# Patient Record
Sex: MALE | Race: BLACK OR AFRICAN AMERICAN | URBAN - METROPOLITAN AREA
[De-identification: names, ages, dates, MRNs, and addresses within clinical notes are randomized per-mention and may not be internally consistent; named-entity substitution may affect disease eponyms.]

---

## 2018-03-13 ENCOUNTER — OFFICE VISIT (OUTPATIENT)
Dept: ORTHOPEDICS | Facility: CLINIC | Age: 48
End: 2018-03-13
Payer: COMMERCIAL

## 2018-03-13 ENCOUNTER — RADIANT APPOINTMENT (OUTPATIENT)
Dept: GENERAL RADIOLOGY | Facility: CLINIC | Age: 48
End: 2018-03-13
Attending: FAMILY MEDICINE
Payer: COMMERCIAL

## 2018-03-13 VITALS — RESPIRATION RATE: 16 BRPM | HEART RATE: 70 BPM | DIASTOLIC BLOOD PRESSURE: 85 MMHG | SYSTOLIC BLOOD PRESSURE: 129 MMHG

## 2018-03-13 DIAGNOSIS — M54.2 CERVICALGIA: Primary | ICD-10-CM

## 2018-03-13 DIAGNOSIS — M79.2 RADICULAR PAIN IN RIGHT ARM: ICD-10-CM

## 2018-03-13 DIAGNOSIS — M54.2 CERVICALGIA: ICD-10-CM

## 2018-03-13 NOTE — PROGRESS NOTES
Sports Medicine Clinic Visit    PCP: No primary care provider on file.    Tony Welsh is a 48 year old male who is seen as self referral presenting with left upper extremity pain and tingling. He also reports pain into left foot. He points to posterior left elbow as painful, tingling into left 2nd and 3rd fingers.     Injury: None    Location of Pain: left upper extremity  Duration of Pain: 3 week(s)  Rating of Pain: 6/10  Pain is better with: Nothing  Pain is worse with: Holding objects, standing, bicep curls  Additional Features: Tingling  Treatment so far consists of: Nothing  Prior History of related problems: Surgery in [place] for Kidney Stones in 1985    There were no vitals taken for this visit.         PMH:  Nephrolithiasis    Active problem list:  There is no problem list on file for this patient.      FH:  No family history on file.    SH:  Social History     Social History     Marital status: Single     Spouse name: N/A     Number of children: N/A     Years of education: N/A     Occupational History     Not on file.     Social History Main Topics     Smoking status: Never Smoker     Smokeless tobacco: Never Used     Alcohol use Yes      Comment: 2-3 drinks per week     Drug use: No     Sexual activity: Not on file     Other Topics Concern     Not on file     Social History Narrative       MEDS:  See EMR, reviewed  ALL:  See EMR, reviewed    REVIEW OF SYSTEMS:  CONSTITUTIONAL:NEGATIVE for fever, chills, change in weight  INTEGUMENTARY/SKIN: NEGATIVE for worrisome rashes, moles or lesions  EYES: NEGATIVE for vision changes or irritation  ENT/MOUTH: NEGATIVE for ear, mouth and throat problems  RESP:NEGATIVE for significant cough or SOB  BREAST: NEGATIVE for masses, tenderness or discharge  CV: NEGATIVE for chest pain, palpitations or peripheral edema  GI: NEGATIVE for nausea, abdominal pain, heartburn, or change in bowel habits  :NEGATIVE for frequency, dysuria, or hematuria  :NEGATIVE for frequency,  dysuria, or hematuria  NEURO: NEGATIVE for weakness, dizziness or paresthesias  ENDOCRINE: NEGATIVE for temperature intolerance, skin/hair changes  HEME/ALLERGY/IMMUNE: NEGATIVE for bleeding problems  PSYCHIATRIC: NEGATIVE for changes in mood or affect        SUBJECTIVE:  This 48-year-old male in the last 3 weeks has had discomfort on the left side of his neck that he feels into his left deltoid area of his shoulder.  He feels it radiating down to his left elbow with a tingling sensation in the second and third fingers of his left hand.  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  Approximately a year ago he had similar trouble that he could feel in his neck and left arm that lasted about a week.  It also involved radiating down the left arm but then it seemed to improve.  He denies any past history of neck injuries.  He denies discomfort or numbness and tingling in the early hours of morning.      OBJECTIVE:  He has no impingement signs at either shoulder.  Strength is intact bilaterally at the deltoid, supraspinatus, infraspinatus, subscapularis, biceps, triceps, forearm flexion, extension, grasp and digiti minimi strength.  Strength is symmetrical bilaterally.  A Phalen's test is negative, and a Tinel sign is negative.  Spurling's test is positive reproducing discomfort into his left upper shoulder.   Distal pulses normal.  Skin normal.  A/o x 3.        X-ray of the cervical spine shows multilevel degenerative disc and joint changes from C4-C5 C5-C6 and mildly C6-C7.    Assessment: Cervicalgia with left radicular arm pain    Plan: With the help of a model we went over the findings of his neck x-ray.  He will look for improvement his symptoms over the next 4 weeks.  Over-the-counter pain medicines and their side effects are explained.  He will see physical therapy for an attempted cervical traction and home traction  if it is helpful as well as a neck maintenance program.  He will follow-up with me in the next 4-6 weeks if not improved.  He knows the next step could be an MRI of the cervical spine if necessary an epidural injection.

## 2018-03-13 NOTE — MR AVS SNAPSHOT
After Visit Summary   3/13/2018    Tony Welsh    MRN: [id number]           Patient Information     Date Of Birth          1970        Visit Information        Provider Department      3/13/2018 9:15 AM Emmanuel Gallegos MD [facility] Sports Medicine        Today's Diagnoses     Cervicalgia    -  1    Radicular pain in right arm           Follow-ups after your visit        Additional Services     [facility] PT, HAND, AND CHIROPRACTIC REFERRAL       === This order will print in the [facility] Scheduling  Office ===    Physical therapy, hand therapy and chiropractic care are available through:    [facility]  [facility]    Call one easy number to schedule at any of the above locations:  [phone].    Your provider has referred you to Physical Therapy at [facility] or [facility], [facility] physical therapy    Indication/Reason for Referral: Neck Pain  Onset of Illness:  And left radicular arm pain x 3 wks.    Therapy Orders:  Evaluate and Treat  Special Programs:  None  Special Request:  None    Additional Comments for the therapist or chiropractor:  Cervical traction, home traction if helpful.   Neck maintenance pgm.  6-8 vits    Please be aware that coverage of these services is subject to the terms and limitations of your health insurance plan.  Call member services at your health plan with any benefit or coverage questions.      Please bring the following to your appointment:    >>   Your personal calendar for scheduling future appointments  >>   Comfortable clothing                  Your next 10 appointments already scheduled     May 11, 2018  9:00 AM CDT   (Arrive by 8:45 AM)   Return Visit with Yumiko Valero MD   [facility] Urology and [facility] ([facility])    [address] [place] [address] [phone]              Who to contact     Please call  your clinic at [phone] to:    Ask questions about your health    Make or cancel appointments    Discuss your medicines    Learn about your test results    Speak to your doctor            Additional Information About Your Visit        MyChart Information     [employer] is an electronic gateway that provides easy, online access to your medical records. With [employer], you can request a clinic appointment, read your test results, renew a prescription or communicate with your care team.     To sign up for [employer] visit the website at [email or web address]   You will be asked to enter the access code listed below, as well as some personal information. Please follow the directions to create your username and password.     Your access code is: [email or web address]  Expires: 2018  7:30 AM     Your access code will  in 90 days. If you need help or a new code, please contact your [facility] or call [phone] for assistance.        Care EveryWhere ID     This is your Care EveryWhere ID. This could be used by other organizations to access your [place] medical records  [id number]        Your Vitals Were     Pulse Respirations                70 16           Blood Pressure from Last 3 Encounters:   18 129/85   16 117/89   16 117/89    Weight from Last 3 Encounters:   16 84.8 kg (187 lb)   16 84.8 kg (187 lb)   16 83 kg (183 lb)              We Performed the Following     [facility] PT, HAND, AND CHIROPRACTIC REFERRAL        Primary Care Provider    None Specified       No primary provider on file.        Equal Access to Services     [facility]: Hadii aad jose juan hadasho Sosimonaali, waaxda luqadaha, qaybta kaalmada adeegyada, kareem waldrop . So [facility] [phone].    ATENCIÓN: Si habla español, tiene a montano disposición servicios gratuitos de asistencia lingüística. Llame al [phone].    We comply with applicable federal civil rights  laws and [place] laws. We do not discriminate on the basis of race, color, national origin, age, disability, sex, sexual orientation, or gender identity.            Thank you!     Thank you for choosing [facility]  for your care. Our goal is always to provide you with excellent care. Hearing back from our patients is one way we can continue to improve our services. Please take a few minutes to complete the written survey that you may receive in the mail after your visit with us. Thank you!             Your Updated Medication List - Protect others around you: Learn how to safely use, store and throw away your medicines at www.disposemymeds.org.          This list is accurate as of 3/13/18 11:59 PM.  Always use your most recent med list.                   Brand Name Dispense Instructions for use Diagnosis    * alfuzosin 10 MG 24 hr tablet    UROXATRAL    90 tablet    Take 1 tablet (10 mg) by mouth daily    Slow urinary stream       * alfuzosin 10 MG 24 hr tablet    UROXATRAL    30 tablet    Take 1 tablet (10 mg) by mouth daily    Urinary hesitancy       * alfuzosin 10 MG 24 hr tablet    UROXATRAL    90 tablet    Take 1 tablet (10 mg) by mouth daily    Urinary frequency       clomiPRAMINE 25 MG capsule    ANAFRANIL    30 capsule    Take 1-2 capsules (25-50 mg) by mouth daily as needed Take at least 4 hours before intercourse to decrease premature ejaculation.    Premature ejaculation       lidocaine 2 % topical gel    XYLOCAINE    30 mL    Apply topically as needed for other (apply 5-10 min before intercourse)    Premature ejaculation       NYQUIL PO           * Notice:  This list has 3 medication(s) that are the same as other medications prescribed for you. Read the directions carefully, and ask your doctor or other care provider to review them with you.

## 2018-03-13 NOTE — LETTER
3/13/2018      RE: Tony Welsh  [address]  [place] [facility] [address]       Sports Medicine Clinic Visit    PCP: No primary care provider on file.    Tony Welsh is a 48 year old male who is seen as self referral presenting with left upper extremity pain and tingling. He also reports pain into left foot. He points to posterior left elbow as painful, tingling into left 2nd and 3rd fingers.     Injury: None    Location of Pain: left upper extremity  Duration of Pain: 3 week(s)  Rating of Pain: 6/10  Pain is better with: Nothing  Pain is worse with: Holding objects, standing, bicep curls  Additional Features: Tingling  Treatment so far consists of: Nothing  Prior History of related problems: Surgery in [place] for Kidney Stones in 1985    There were no vitals taken for this visit.         PMH:  Nephrolithiasis    Active problem list:  There is no problem list on file for this patient.      FH:  No family history on file.    SH:  Social History     Social History     Marital status: Single     Spouse name: N/A     Number of children: N/A     Years of education: N/A     Occupational History     Not on file.     Social History Main Topics     Smoking status: Never Smoker     Smokeless tobacco: Never Used     Alcohol use Yes      Comment: 2-3 drinks per week     Drug use: No     Sexual activity: Not on file     Other Topics Concern     Not on file     Social History Narrative       MEDS:  See EMR, reviewed  ALL:  See EMR, reviewed    REVIEW OF SYSTEMS:  CONSTITUTIONAL:NEGATIVE for fever, chills, change in weight  INTEGUMENTARY/SKIN: NEGATIVE for worrisome rashes, moles or lesions  EYES: NEGATIVE for vision changes or irritation  ENT/MOUTH: NEGATIVE for ear, mouth and throat problems  RESP:NEGATIVE for significant cough or SOB  BREAST: NEGATIVE for masses, tenderness or discharge  CV: NEGATIVE for chest pain, palpitations or peripheral edema  GI: NEGATIVE for nausea, abdominal pain, heartburn, or change in bowel  habits  :NEGATIVE for frequency, dysuria, or hematuria  :NEGATIVE for frequency, dysuria, or hematuria  NEURO: NEGATIVE for weakness, dizziness or paresthesias  ENDOCRINE: NEGATIVE for temperature intolerance, skin/hair changes  HEME/ALLERGY/IMMUNE: NEGATIVE for bleeding problems  PSYCHIATRIC: NEGATIVE for changes in mood or affect        SUBJECTIVE:  This 48-year-old male in the last 3 weeks has had discomfort on the left side of his neck that he feels into his left deltoid area of his shoulder.  He feels it radiating down to his left elbow with a tingling sensation in the second and third fingers of his left hand.  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  Approximately a year ago he had similar trouble that he could feel in his neck and left arm that lasted about a week.  It also involved radiating down the left arm but then it seemed to improve.  He denies any past history of neck injuries.  He denies discomfort or numbness and tingling in the early hours of morning.      OBJECTIVE:  He has no impingement signs at either shoulder.  Strength is intact bilaterally at the deltoid, supraspinatus, infraspinatus, subscapularis, biceps, triceps, forearm flexion, extension, grasp and digiti minimi strength.  Strength is symmetrical bilaterally.  A Phalen's test is negative, and a Tinel sign is negative.  Spurling's test is positive reproducing discomfort into his left upper shoulder.   Distal pulses normal.  Skin normal.  A/o x 3.        X-ray of the cervical spine shows multilevel degenerative disc and joint changes from C4-C5 C5-C6 and mildly C6-C7.    Assessment: Cervicalgia with left radicular arm pain    Plan: With the help of a model we went over the findings of his neck x-ray.  He will look for improvement his symptoms over the next 4 weeks.  Over-the-counter pain medicines and their side effects are  explained.  He will see physical therapy for an attempted cervical traction and home traction if it is helpful as well as a neck maintenance program.  He will follow-up with me in the next 4-6 weeks if not improved.  He knows the next step could be an MRI of the cervical spine if necessary an epidural injection.    Emmanuel Gallegos MD

## 2018-06-01 DIAGNOSIS — R39.15 URINARY URGENCY: Primary | ICD-10-CM

## 2018-06-05 DIAGNOSIS — R35.0 URINARY FREQUENCY: Primary | ICD-10-CM

## 2018-06-05 LAB — RESIDUAL VOLUME (RV) (EXTERNAL): 80

## 2018-06-07 ENCOUNTER — OFFICE VISIT (OUTPATIENT)
Dept: UROLOGY | Facility: CLINIC | Age: 48
End: 2018-06-07
Payer: COMMERCIAL

## 2018-06-07 VITALS
BODY MASS INDEX: 27.31 KG/M2 | SYSTOLIC BLOOD PRESSURE: 102 MMHG | WEIGHT: 160 LBS | HEIGHT: 64 IN | HEART RATE: 65 BPM | OXYGEN SATURATION: 95 % | DIASTOLIC BLOOD PRESSURE: 68 MMHG

## 2018-06-07 DIAGNOSIS — R39.15 URINARY URGENCY: ICD-10-CM

## 2018-06-07 LAB
ALBUMIN UR-MCNC: NEGATIVE MG/DL
APPEARANCE UR: CLEAR
BILIRUB UR QL STRIP: NEGATIVE
COLOR UR AUTO: YELLOW
GLUCOSE UR STRIP-MCNC: NEGATIVE MG/DL
HGB UR QL STRIP: NEGATIVE
KETONES UR STRIP-MCNC: NEGATIVE MG/DL
LEUKOCYTE ESTERASE UR QL STRIP: NEGATIVE
NITRATE UR QL: NEGATIVE
PH UR STRIP: 6.5 PH (ref 5–7)
PSA SERPL-MCNC: 2.4 NG/ML (ref 0–4)
RESIDUAL VOLUME (RV) (EXTERNAL): 20
SOURCE: NORMAL
SP GR UR STRIP: 1.02 (ref 1–1.03)
UROBILINOGEN UR STRIP-ACNC: 0.2 EU/DL (ref 0.2–1)

## 2018-06-07 PROCEDURE — 84153 ASSAY OF PSA TOTAL: CPT | Performed by: UROLOGY

## 2018-06-07 PROCEDURE — 81003 URINALYSIS AUTO W/O SCOPE: CPT | Performed by: UROLOGY

## 2018-06-07 PROCEDURE — 36415 COLL VENOUS BLD VENIPUNCTURE: CPT | Performed by: UROLOGY

## 2018-06-07 PROCEDURE — 99213 OFFICE O/P EST LOW 20 MIN: CPT | Performed by: UROLOGY

## 2018-06-07 PROCEDURE — 51798 US URINE CAPACITY MEASURE: CPT

## 2018-06-07 RX ORDER — ALFUZOSIN HYDROCHLORIDE 10 MG/1
10 TABLET, EXTENDED RELEASE ORAL DAILY
Qty: 30 TABLET | Refills: 3 | Status: SHIPPED | OUTPATIENT
Start: 2018-06-07

## 2018-06-07 ASSESSMENT — PAIN SCALES - GENERAL: PAINLEVEL: NO PAIN (0)

## 2018-06-07 NOTE — NURSING NOTE
Chief Complaint   Patient presents with     Clinic Care Coordination - Follow-up     Pt here for follow-up     Estelle Chapin CMA

## 2018-06-07 NOTE — MR AVS SNAPSHOT
"              After Visit Summary   6/7/2018    Tony Welsh    MRN: [id number]           Patient Information     Date Of Birth          1970        Visit Information        Provider Department      6/7/2018 10:15 AM Yumiko Valero MD [facility] [place]        Today's Diagnoses     Urinary urgency           Follow-ups after your visit        Follow-up notes from your care team     Return in about 8 weeks (around 8/2/2018) for uroflow/PVR .      Your next 10 appointments already scheduled     Aug 07, 2018  9:30 AM CDT   Return Visit with Yumiko Valero MD   [facility] ([facility] [place])    [address]  [address]  [place] [address] [phone]              Who to contact     If you have questions or need follow up information about today's clinic visit or your schedule please contact [facility] UROLOGY [facility] directly at [phone].  Normal or non-critical lab and imaging results will be communicated to you by [employer], letter or phone within 4 business days after the clinic has received the results. If you do not hear from us within 7 days, please contact the clinic through Zapnipt or phone. If you have a critical or abnormal lab result, we will notify you by phone as soon as possible.  Submit refill requests through [employer] or call your pharmacy and they will forward the refill request to us. Please allow 3 business days for your refill to be completed.          Additional Information About Your Visit        [employer] Information     [employer] lets you send messages to your doctor, view your test results, renew your prescriptions, schedule appointments and more. To sign up, go to [email or web address] . Click on \"Log in\" on the left side of the screen, which will take you to the Welcome page. Then click on \"Sign up Now\" on the right side of the page.     You will be asked " "to enter the access code listed below, as well as some personal information. Please follow the directions to create your username and password.     Your access code is: [email or web address]  Expires: 2018 10:53 AM     Your access code will  in 90 days. If you need help or a new code, please call your [place] clinic or [phone].        Care EveryWhere ID     This is your Care EveryWhere ID. This could be used by other organizations to access your [place] medical records  [id number]        Your Vitals Were     Pulse Height Pulse Oximetry BMI (Body Mass Index)          65 1.626 m (5' 4\") 95% 27.46 kg/m2         Blood Pressure from Last 3 Encounters:   18 102/68   18 126/80   18 129/85    Weight from Last 3 Encounters:   18 72.6 kg (160 lb)   18 72.6 kg (160 lb)   16 84.8 kg (187 lb)              We Performed the Following     PSA Diag Urologic Phys          Today's Medication Changes          These changes are accurate as of 18 10:53 AM.  If you have any questions, ask your nurse or doctor.               Start taking these medicines.        Dose/Directions    alfuzosin 10 MG 24 hr tablet   Commonly known as:  UROXATRAL   Used for:  Urinary urgency   Started by:  Yumiko Valero MD        Dose:  10 mg   Take 1 tablet (10 mg) by mouth daily   Quantity:  30 tablet   Refills:  3            Where to get your medicines      These medications were sent to [id number] - [place], [place] - [address] [place] [address] AT [facility] & [address]  [address], [place] [address]     Phone:  [phone]     alfuzosin 10 MG 24 hr tablet                Primary Care Provider Fax #    Physician No Ref-Primary [phone]       No address on file        Equal Access to Services     ANDREW GRANADOS : Jose Alfredo Roper, wayoanada luqadaha, qaybta kaalmada alberto, kareem farah So [facility] [phone].    ATENCIÓN: Si grace cash " a montano disposición servicios gratuitos de asistencia lingüística. Kody cole [phone].    We comply with applicable federal civil rights laws and [place] laws. We do not discriminate on the basis of race, color, national origin, age, disability, sex, sexual orientation, or gender identity.            Thank you!     Thank you for choosing [facility] [place]  for your care. Our goal is always to provide you with excellent care. Hearing back from our patients is one way we can continue to improve our services. Please take a few minutes to complete the written survey that you may receive in the mail after your visit with us. Thank you!             Your Updated Medication List - Protect others around you: Learn how to safely use, store and throw away your medicines at www.disposemymeds.org.          This list is accurate as of 6/7/18 10:53 AM.  Always use your most recent med list.                   Brand Name Dispense Instructions for use Diagnosis    alfuzosin 10 MG 24 hr tablet    UROXATRAL    30 tablet    Take 1 tablet (10 mg) by mouth daily    Urinary urgency

## 2018-06-07 NOTE — PROGRESS NOTES
"UROLOGIC DIAGNOSES:     CURRENT INTERVENTIONS:       HISTORY:     Patient previously seen a few year ago with the below symptoms:   Continues to note LUTS including Nocturia, urgency, and weak stream. Tried the Alfuzosin but it made him feel light headed and increased his heart rate.  He only took it for 2 days without changes in urinary symptoms.      Currently:   Notes dysuria, nocturia 2-3x, daytime frequency, irregular (but often slowed stream) urgency.     Patient does not want to consider outlet procedure at present (previously cystoscopy showed very large median lobe) but would like to consider medical therapy.    We discussed alpha blocker use given previous episodes of lightheadedness. Patient would like to proceed.             PAST MEDICAL HISTORY: History reviewed. No pertinent past medical history.    PAST SURGICAL HISTORY:   Past Surgical History:   Procedure Laterality Date     ------------OTHER-------------  1985    kidney stones     APPENDECTOMY OPEN       AS REMOVAL OF KIDNEY STONE         FAMILY HISTORY: History reviewed. No pertinent family history.    SOCIAL HISTORY:   Social History   Substance Use Topics     Smoking status: Never Smoker     Smokeless tobacco: Never Used     Alcohol use Yes      Comment: 2-3 drinks per week       No current outpatient prescriptions on file.     No current facility-administered medications for this visit.          PHYSICAL EXAM:    /68 (BP Location: Left arm, Patient Position: Sitting, Cuff Size: Adult Regular)  Pulse 65  Ht 1.626 m (5' 4\")  Wt 72.6 kg (160 lb)  SpO2 95%  BMI 27.46 kg/m2    HEENT: Normocephalic and atraumatic   Cardiac: Not done  Back/Flank: Not done  CNS/PNS: Not done  Respiratory: Normal non-labored breathing  Abdomen: Soft nontender and nondistended  Peripheral Vascular: Not done  Mental Status: Not done    Penis: Not done  Scrotal Skin: Not done  Testicles: Not done  Epididymis: Not done  Digital Rectal Exam: 1-2+, no nodules "     Cystoscopy: Not done    Imaging: None    Urinalysis: UA RESULTS:  Recent Labs   Lab Test  09/23/16   0930   COLOR  Yellow   APPEARANCE  Clear   URINEGLC  Negative   URINEBILI  Negative   URINEKETONE  Negative   SG  1.016   UBLD  Negative   URINEPH  5.0   PROTEIN  Negative   NITRITE  Negative   LEUKEST  Negative   RBCU  1   WBCU  1       PSA: 2.40    Post Void Residual: 20ml    Other labs: None today      IMPRESSION:  47 y/o M with lower urinary tract symptoms     PLAN:  Start trial of alfuzosin   Follow up in 6-8 weeks for uroflow/PVR     Total Time: 15 minutes                                  Total in Consultation: greater than 50%

## 2018-06-07 NOTE — LETTER
"6/7/2018       RE: Tony Welsh  [address]  [place] [address]     Dear Colleague,    Thank you for referring your patient, Tony Welsh, to the [facility] [place] at [facility]. Please see a copy of my visit note below.    UROLOGIC DIAGNOSES:     CURRENT INTERVENTIONS:       HISTORY:     Patient previously seen a few year ago with the below symptoms:   Continues to note LUTS including Nocturia, urgency, and weak stream. Tried the Alfuzosin but it made him feel light headed and increased his heart rate.  He only took it for 2 days without changes in urinary symptoms.      Currently:   Notes dysuria, nocturia 2-3x, daytime frequency, irregular (but often slowed stream) urgency.     Patient does not want to consider outlet procedure at present (previously cystoscopy showed very large median lobe) but would like to consider medical therapy.    We discussed alpha blocker use given previous episodes of lightheadedness. Patient would like to proceed.             PAST MEDICAL HISTORY: History reviewed. No pertinent past medical history.    PAST SURGICAL HISTORY:   Past Surgical History:   Procedure Laterality Date     ------------OTHER-------------  1985    kidney stones     APPENDECTOMY OPEN       AS REMOVAL OF KIDNEY STONE         FAMILY HISTORY: History reviewed. No pertinent family history.    SOCIAL HISTORY:   Social History   Substance Use Topics     Smoking status: Never Smoker     Smokeless tobacco: Never Used     Alcohol use Yes      Comment: 2-3 drinks per week       No current outpatient prescriptions on file.     No current facility-administered medications for this visit.          PHYSICAL EXAM:    /68 (BP Location: Left arm, Patient Position: Sitting, Cuff Size: Adult Regular)  Pulse 65  Ht 1.626 m (5' 4\")  Wt 72.6 kg (160 lb)  SpO2 95%  BMI 27.46 kg/m2    HEENT: Normocephalic and atraumatic   Cardiac: Not " done  Back/Flank: Not done  CNS/PNS: Not done  Respiratory: Normal non-labored breathing  Abdomen: Soft nontender and nondistended  Peripheral Vascular: Not done  Mental Status: Not done    Penis: Not done  Scrotal Skin: Not done  Testicles: Not done  Epididymis: Not done  Digital Rectal Exam: 1-2+, no nodules     Cystoscopy: Not done    Imaging: None    Urinalysis: UA RESULTS:  Recent Labs   Lab Test  09/23/16   0930   COLOR  Yellow   APPEARANCE  Clear   URINEGLC  Negative   URINEBILI  Negative   URINEKETONE  Negative   SG  1.016   UBLD  Negative   URINEPH  5.0   PROTEIN  Negative   NITRITE  Negative   LEUKEST  Negative   RBCU  1   WBCU  1       PSA: 2.40    Post Void Residual: 20ml    Other labs: None today      IMPRESSION:  49 y/o M with lower urinary tract symptoms     PLAN:  Start trial of alfuzosin   Follow up in 6-8 weeks for uroflow/PVR     Total Time: 15 minutes                                  Total in Consultation: greater than 50%       Again, thank you for allowing me to participate in the care of your patient.      Sincerely,    Yumiko Valero MD

## 2018-08-02 DIAGNOSIS — R35.0 URINARY FREQUENCY: Primary | ICD-10-CM
